# Patient Record
Sex: FEMALE | Race: WHITE | NOT HISPANIC OR LATINO | URBAN - METROPOLITAN AREA
[De-identification: names, ages, dates, MRNs, and addresses within clinical notes are randomized per-mention and may not be internally consistent; named-entity substitution may affect disease eponyms.]

---

## 2023-04-24 ENCOUNTER — OFFICE (OUTPATIENT)
Dept: URBAN - METROPOLITAN AREA CLINIC 67 | Facility: CLINIC | Age: 28
End: 2023-04-24

## 2023-04-24 VITALS
WEIGHT: 293 LBS | SYSTOLIC BLOOD PRESSURE: 108 MMHG | HEIGHT: 66 IN | HEART RATE: 106 BPM | DIASTOLIC BLOOD PRESSURE: 70 MMHG

## 2023-04-24 DIAGNOSIS — R10.9 UNSPECIFIED ABDOMINAL PAIN: ICD-10-CM

## 2023-04-24 DIAGNOSIS — R19.5 OTHER FECAL ABNORMALITIES: ICD-10-CM

## 2023-04-24 DIAGNOSIS — R19.7 DIARRHEA, UNSPECIFIED: ICD-10-CM

## 2023-04-24 PROCEDURE — 99204 OFFICE O/P NEW MOD 45 MIN: CPT | Performed by: NURSE PRACTITIONER

## 2023-04-24 NOTE — SERVICEHPINOTES
Rosmery Camacho   is seen for an initial visit today   as a referral from Brielle Starr for loose stools. Today she reports that she has had diarrhea since she was 16 years old. She will have up to 6-7 episodes of loose stools a day. The loose stools happens after she eats within 30 minutes and with an increase in liquids. She does report abdominal pain at times that is cramp like in nature associated with loose stools. No blood in stool, but does report mucus frequently. She also reports nausea and chills at times. Vomiting is rare because she will take Zofran when nausea occurs
br
br She denies any reflux, dysphagia, constipation, significant weight loss, change in appetite, fever or increase in fatigue. She has never had any of these symptoms worked up.  Labs 
br 12/21/2022 CBC- normal (she does report a history of elevated WBC and has been to Hematology in the past and negative workup).

## 2023-04-24 NOTE — SERVICENOTES
- Labs today 
- Bring back stool studies 
- Will make additional recommendations based on labs and stool studies